# Patient Record
(demographics unavailable — no encounter records)

---

## 2020-08-27 NOTE — OP
PATIENT NAME:  BRANDEN GASTELUM                            MEDICAL RECORD: [id number]
:58                                             LOCATION:D.OPS          
                                                         ADMISSION DATE:        
SURGEON:  FORTUNATO CASTRO MD        
 
 
DATE OF OPERATION:  2020
 
PREOPERATIVE DIAGNOSIS:  Fracture of lateral malleolus, left ankle.
 
POSTOPERATIVE DIAGNOSIS:  Fracture of lateral malleolus, left ankle.
 
PROCEDURE:  Open reduction and internal fixation of fractured lateral malleolus.
 
SURGEON:  Fortunato Castro MD
 
ASSISTANT:  ADELSO Stewart
 
INTRAOPERATIVE COMPLICATIONS:  None.
 
SUMMARY OF PATHOLOGIC FINDINGS:  The patient was indeed found to have a
displaced lateral malleolus consistent with preoperative radiographs.
 
OPERATIVE SUMMARY IN DETAIL:  After obtaining the appropriate preoperative
orthopedic surgery consent as well as anesthetic consultation, evaluation and
clearance, the patient was brought to the operating room and placed on the
operating table in supine position.  After adequate general laryngeal mask was
administered, tourniquet was placed about the proximal aspect of left lower
extremity.  Left lower extremity was then prepped and draped in routine sterile
fashion.  The leg was elevated and exsanguinated, tourniquet inflated to 350
mmHg.  Routine timeout was taken and agreed upon by all given the patient's
unique identifiers.
 
Incision was made over the lateral mass from the tip of the lateral malleolus
down to the fibula.  Fracture was identified and all interposed fracture
hematoma was removed and thoroughly irrigated.  Fracture reduction was then
completed with reduction forceps.  Provisional K-wire then held the fracture in
place while a [place] VariAx 2 plate was then put into place with a combination
of both compression and locking screws.  This was all done under fluoroscopy. 
Final AP and lateral radiographs were taken and submitted for radiographic
review.  The wound was then copiously irrigated and closed with #1 Vicryl, 2-0
Vicryl and skin jesse done by ADELSO Stewart.  Sterile dressings were
applied.  Tourniquet was deflated.  Posterior L&U splint was applied.  The
patient was then awakened and taken to the recovery room in stable condition. 
All final needle and sponge counts were correct.
 
TRANSINT:[id number] Voice Confirmation ID: [id number] DOCUMENT ID: [id number]
                                           
                                           FORTUNATO CASTRO MD        
 
 
 
Electronically Signed by FORTUNATO CASTRO MD on 20 at 0708
CC:                                                             [id number]
DICTATION DATE: 20 1430     :     20 1612      [facility] 
                                                                      20
[facility]                                          
[address] [place] [address]